# Patient Record
Sex: MALE | Race: BLACK OR AFRICAN AMERICAN | NOT HISPANIC OR LATINO | Employment: UNEMPLOYED | URBAN - METROPOLITAN AREA
[De-identification: names, ages, dates, MRNs, and addresses within clinical notes are randomized per-mention and may not be internally consistent; named-entity substitution may affect disease eponyms.]

---

## 2022-09-29 PROBLEM — C18.9 COLON ADENOCARCINOMA: Status: ACTIVE | Noted: 2022-09-29

## 2022-10-06 PROBLEM — Z51.11 ENCOUNTER FOR ANTINEOPLASTIC CHEMOTHERAPY: Status: ACTIVE | Noted: 2022-10-06

## 2022-10-12 ENCOUNTER — SPECIALTY PHARMACY (OUTPATIENT)
Dept: PHARMACY | Facility: CLINIC | Age: 57
End: 2022-10-12

## 2022-10-19 NOTE — TELEPHONE ENCOUNTER
Received rx for Xeloda. PA has been in process. > 24hr since initiation, will reach out to PA department for answers.    [place], this is Sharron Thakkar with [facility].  We are working on your prescription that your doctor has sent us. We will be working with your insurance to get this approved for you. We will be calling you along the way with updates on your medication.  If you have any questions, you can reach us at [phone].    Welcome call outcome: No answer/Unable to leave voicemail    Called to update pt. No VM box.

## 2022-10-24 ENCOUNTER — PATIENT MESSAGE (OUTPATIENT)
Dept: PHARMACY | Facility: CLINIC | Age: 57
End: 2022-10-24

## 2022-10-24 NOTE — TELEPHONE ENCOUNTER
PA denied stating Xeloda to be covered under Part B. Medicare card not on file.No voicemail box set up on either phone number. Message sent to portal.

## 2022-10-31 NOTE — TELEPHONE ENCOUNTER
Was able to contact pt and spouse. Pt unsure if taking pantoprazole, will notify MD. See I-vent. Received HH size and monthly income for FA to investigate copay assistance.

## 2022-10-31 NOTE — TELEPHONE ENCOUNTER
Xeloda covered with est copay of $22.83 for 21 day supply. Will again attempt to reach out to pt. Will inquire about pantoprazole use.

## 2022-10-31 NOTE — TELEPHONE ENCOUNTER
Sent assistance application to [facility] to request funding for Xeloda copay. Determination pending. Will continue to follow up.

## 2022-11-02 ENCOUNTER — SPECIALTY PHARMACY (OUTPATIENT)
Dept: PHARMACY | Facility: CLINIC | Age: 57
End: 2022-11-02

## 2022-11-02 ENCOUNTER — PATIENT MESSAGE (OUTPATIENT)
Dept: PHARMACY | Facility: CLINIC | Age: 57
End: 2022-11-02

## 2022-11-02 NOTE — TELEPHONE ENCOUNTER
Specialty Pharmacy - Initial Clinical Assessment    Specialty Medication Orders Linked to Encounter      Flowsheet Row Most Recent Value   Medication #1 capecitabine (XELODA) 500 MG Tab (Order#[id number], Rx#[id number])          Patient Diagnosis   C18.9 - Colon adenocarcinoma    Subjective    Malik Figueroa is a 57 y.o. male, who is followed by the specialty pharmacy service for management and education.    Recent Encounters       Date Type Provider Description    11/02/2022 Specialty Pharmacy Sharron Thakkar PharmD Initial Clinical Assessment    10/12/2022 Specialty Pharmacy Sharron Thakkar PharmD Referral Authorization          Clinical call attempts since last clinical assessment   11/2/2022  7:34 PM - Specialty Pharmacy - Clinical Assessment by Sharron Thakkar PharmD     Current Outpatient Medications   Medication Sig Note    amitriptyline (ELAVIL) 25 MG tablet Take 25 mg by mouth.     capecitabine (XELODA) 500 MG Tab Take 4 tablets (2,000 mg total) by mouth 2 (two) times daily.     capecitabine (XELODA) 500 MG Tab Take 4 tablets (2000 mg) twice daily for days 1-14 of every 21 day cycle.     cefpodoxime (VANTIN) 200 MG tablet  11/2/2022: Not sure if taking    dicyclomine (BENTYL) 20 mg tablet Take 20 mg by mouth every 6 (six) hours as needed.     divalproex (DEPAKOTE) 500 MG TbEC Take 500 mg by mouth every evening.     LINZESS 145 mcg Cap capsule SPRINKLE ENTIRE CONTENTS ON SMALL AMOUNT OF APPLESAUCE TAKE IMMEDIATELY BY MOUTH ON AN EMPTY STOMACH AT LEAST 30 MINUTES PRIOR 1ST MEAL OF THE DAY     naloxone (NARCAN) 4 mg/actuation Spry CALL 911. SPR CONTENTS OF ONE SPRAYER (0.1ML) INTO ONE NOSTRIL. REPEAT IN 2-3 MIN IF SYMPTOMS OF OPIOID EMERGENCY PERSIST, ALTERNATE NOSTRILS     ondansetron (ZOFRAN-ODT) 4 MG TbDL Dissolve 1 tablet by mouth 2 (two) times daily.     oxyCODONE (ROXICODONE) 5 MG immediate release tablet Take 1 tablet (5 mg total) by mouth every 12 (twelve) hours as needed for Pain.      pantoprazole (PROTONIX) 40 MG tablet Take 40 mg by mouth every morning. 11/2/2022: Unsure if taking    rizatriptan (MAXALT-MLT) 10 MG disintegrating tablet DISSOLVE ONE TABLET UNDER THE TONGUE AT ONSET OF HEADACHE     traMADoL (ULTRAM) 50 mg tablet Take 50 mg by mouth 2 (two) times daily as needed. 11/2/2022: Not taking since started oxycodone   Last reviewed on 11/2/2022  3:11 PM by Sharron Thakkar, PharmD    Review of patient's allergies indicates:   Allergen Reactions    Aspirin     Ibuprofen     Penicillins    Last reviewed on  11/2/2022 3:11 PM by Sharron Thakkar    Drug Interactions    Drug interactions evaluated: yes  Clinically relevant drug interactions identified: no  Provided the patient with educational material regarding drug interactions: not applicable         Adverse Effects    Fatigue: Pos  Constipation: Pos  *All other systems reviewed and are negative       Assessment Questions - Documented Responses      Flowsheet Row Most Recent Value   Assessment    Medication Reconciliation completed for patient Yes   During the past 4 weeks, has patient missed any activities due to condition or medication? No   During the past 4 weeks, did patient have any of the following urgent care visits? None   Goals of Therapy Status Discussed (new start)   Status of the patients ability to self-administer: Is Able   All education points have been covered with patient? Yes, supplemental printed education provided   Welcome packet contents reviewed and discussed with patient? Yes   Assesment completed? Yes   Plan Therapy being initiated   Do you need to open a clinical intervention (i-vent)? No   Do you want to schedule first shipment? Yes   Medication #1 Assessment Info    Patient status New medication, New to [facility]   Is this medication appropriate for the patient? Yes   Is this medication effective? Not yet started          Refill Questions - Documented Responses      Flowsheet Row Most Recent Value   Patient  "Availability and HIPAA Verification    Does patient want to proceed with activity? Yes   HIPAA/medical authority confirmed? Yes   Relationship to patient of person spoken to? Self   Refill Screening Questions    When does the patient need to receive the medication? 11/05/22   Refill Delivery Questions    How will the patient receive the medication? Mail   When does the patient need to receive the medication? 11/05/22   Shipping Address Home   Address in [facility] confirmed and updated if neccessary? Yes   Expected Copay ($) 22.83   Is the patient able to afford the medication copay? Yes   Payment Method invoice (approval required)  [CAGNO approved]   Days supply of Refill 21   Supplies needed? No supplies needed   Refill activity completed? Yes   Refill activity plan Refill scheduled   Shipment/Pickup Date: 11/03/22            Objective    He has a past medical history of Back pain, Colon cancer, and TBI (traumatic brain injury).    Tried/failed medications: None    BP Readings from Last 4 Encounters:   10/25/22 136/84   10/11/22 138/83   10/04/22 134/88   10/04/22 (!) 142/72     Ht Readings from Last 4 Encounters:   10/25/22 5' 9" (1.753 m)   10/11/22 5' 9" (1.753 m)   10/04/22 5' 9" (1.753 m)   09/27/22 5' 9" (1.753 m)     Wt Readings from Last 4 Encounters:   10/25/22 72.8 kg (160 lb 9.6 oz)   10/11/22 74.1 kg (163 lb 6.4 oz)   10/04/22 73.6 kg (162 lb 3.2 oz)   09/27/22 75.3 kg (166 lb 1.6 oz)     Recent Labs   Lab Result Units 10/25/22  0846 10/11/22  0935 10/04/22  0940 09/27/22  0924   RBC M/uL 4.25 L 4.25 L 3.92 L 3.81 L   Hemoglobin g/dL 8.7 L 8.7 L 7.4 L 7.5 L   Hematocrit % 30.4 L 29.6 L 26.4 L 25.7 L   WBC K/uL 7.51 7.80 6.86 6.71   Gran # (ANC) K/uL 5.2 5.6 4.6 4.8   Gran % % 69.0 71.5 67.0 71.6   Platelets K/uL 216 379 511 H 435   Sodium mmol/L 137 136 135 L 135 L   Potassium mmol/L 3.2 L 3.7 3.2 L 3.3 L   Chloride mmol/L 104 101 101 103   Glucose mg/dL 100 100 93 101   BUN mg/dL 11 12 11 7 "   Creatinine mg/dL 0.9 0.8 1.0 1.0   Calcium mg/dL 8.6 L 8.6 L 8.7 8.5 L   Total Protein g/dL 7.4 7.4 7.9 7.6   Albumin g/dL 2.8 L 2.5 L 2.9 L 2.5 L   Total Bilirubin mg/dL 0.7 0.8 0.8 0.6   Alkaline Phosphatase U/L 179 H 249 H 252 H 248 H   AST U/L 38 47 H 36 35   ALT U/L 29 30 27 29     The goals of cancer treatment include:  Achieving remission of cancer, if possible  Reducing tumor size and spread of cancer, if remission is not possible  Minimizing pain and symptoms of the cancer  Preventing infection and other complications of treatment  Promoting adequate nutrition  Encouraging proper hydration  Improving or maintaining quality of life  Maintaining optimal therapy adherence  Minimizing and managing side effects    Goals of Therapy Status: Discussed (new start)    Assessment/Plan  Patient plans to continue therapy without changes      Indication, dosage, appropriateness, effectiveness, safety and convenience of his specialty medication(s) were reviewed today.     Patient Education   Patient received education on the following:   Expectations and possible outcomes of therapy  Proper use, timely administration, and missed dose management  Duration of therapy  Side effects, including prevention, minimization, and management  Contraindications and safety precautions  New or changed medications, including prescribe and over the counter medications and supplements  Reviews recommended vaccinations, as appropriate  Storage, safe handling, and disposal    Pt started on therapy already, about 1 week into current cycle. Sending with calendar with days taken marked off. Counseled pt to bring current medication to doctors office at next visit so she may review. Pt not sure if taking pantoprazole. MD aware and will discuss at next visit.    Tasks added this encounter   11/16/2022 - Refill Call (Auto Added)  4/24/2023 - Clinical - Follow Up Assesement (180 day)   Tasks due within next 3 months   No tasks due.     Sharron  Bijan, PharmD  [facility]  [address]  [facility] [address]  Phone: [phone]  Fax: [phone]

## 2022-11-02 NOTE — TELEPHONE ENCOUNTER
Pt spouse not available to speak currently. Ask me to call Mr. Gan directly. No answer and no VM box to leave message.

## 2022-11-05 ENCOUNTER — TELEPHONE (OUTPATIENT)
Dept: PHARMACY | Facility: CLINIC | Age: 57
End: 2022-11-05

## 2022-11-05 NOTE — TELEPHONE ENCOUNTER
Patients wife left a message for the on call pharmacist. Xeloda was scheduled to be delivered by [employer], but the patient did not receive it. [employer] tracking indicates that there was a delivery exception. The package is currently at the [employer] shipping center at [address] [place] [address] in [place], [place]. According to the website, the [employer] shipping center is open until 2:00 on Saturday, and closed on Sunday. Called the number listed, and the automated system confirms that  is available until 2:00 pm. Called the patient's wife back and explained delivery exception and where to  the package. Patient's wife provided with the address and phone number to [employer]. [address] [place] [address] [phone]. Tracking number [id number] also provided. Routing assigned pharmacist to follow up on the patient receiving the medication.

## 2022-11-07 NOTE — TELEPHONE ENCOUNTER
Follow up call with the patient regarding the Xeloda prescription. Pt was able to  Xeloda from [employer] on Saturday 11/5/22. Pt did not miss any doses. Pt had no further questions or concerns.

## 2022-11-07 NOTE — TELEPHONE ENCOUNTER
Spoke with pts wife and updated currently address. Informed that we will check again before next shipment. Voiced understanding.

## 2022-11-16 ENCOUNTER — PATIENT MESSAGE (OUTPATIENT)
Dept: PHARMACY | Facility: CLINIC | Age: 57
End: 2022-11-16

## 2022-11-16 ENCOUNTER — SPECIALTY PHARMACY (OUTPATIENT)
Dept: PHARMACY | Facility: CLINIC | Age: 57
End: 2022-11-16

## 2022-11-16 NOTE — TELEPHONE ENCOUNTER
Outgoing call to pt about refill of Xeloda. Pt stated yesterday was day 1 of cycle, but refills are off. Pt has 6 days of therapy on hand and will need refill by 11/23. Claim RTS until 11/18. Patient was agreeable to call back on 11/18 for refill.

## 2022-11-18 ENCOUNTER — PATIENT MESSAGE (OUTPATIENT)
Dept: PHARMACY | Facility: CLINIC | Age: 57
End: 2022-11-18

## 2022-11-21 ENCOUNTER — SPECIALTY PHARMACY (OUTPATIENT)
Dept: PHARMACY | Facility: CLINIC | Age: 57
End: 2022-11-21

## 2022-11-21 NOTE — TELEPHONE ENCOUNTER
Specialty Pharmacy - Refill Coordination    Specialty Medication Orders Linked to Encounter      Flowsheet Row Most Recent Value   Medication #1 capecitabine (XELODA) 500 MG Tab (Order#[id number], Rx#[id number])          Refill Questions - Documented Responses      Flowsheet Row Most Recent Value   Patient Availability and HIPAA Verification    Does patient want to proceed with activity? Yes   HIPAA/medical authority confirmed? Yes   Relationship to patient of person spoken to? Spouse/Significant Other   Refill Screening Questions    Changes to allergies? No   Changes to medications? No   New conditions since last clinic visit? No   Unplanned office visit, urgent care, ED, or hospital admission in the last 4 weeks? No   How does patient/caregiver feel medication is working? Good   Financial problems or insurance changes? No   How many doses of your specialty medications were missed in the last 4 weeks? 0   Would patient like to speak to a pharmacist? No   When does the patient need to receive the medication? 11/27/22   Refill Delivery Questions    How will the patient receive the medication? Mail   When does the patient need to receive the medication? 11/27/22   Shipping Address Home   Address in [facility] confirmed and updated if neccessary? Yes   Expected Copay ($) 22.83   Is the patient able to afford the medication copay? No   Payment Method invoice (approval required)  [pending for KP]   Days supply of Refill 21   Supplies needed? No supplies needed   Refill activity completed? Yes   Refill activity plan Refill scheduled   Shipment/Pickup Date: 11/22/22            Current Outpatient Medications   Medication Sig    amitriptyline (ELAVIL) 25 MG tablet Take 25 mg by mouth.    capecitabine (XELODA) 500 MG Tab Take 4 tablets (2,000 mg total) by mouth 2 (two) times daily.    capecitabine (XELODA) 500 MG Tab Take 4 tablets (2000 mg) twice daily for days 1-14 of every 21 day cycle.    cefpodoxime (VANTIN)  200 MG tablet     dicyclomine (BENTYL) 20 mg tablet Take 20 mg by mouth every 6 (six) hours as needed.    divalproex (DEPAKOTE) 500 MG TbEC Take 500 mg by mouth every evening.    LINZESS 145 mcg Cap capsule SPRINKLE ENTIRE CONTENTS ON SMALL AMOUNT OF APPLESAUCE TAKE IMMEDIATELY BY MOUTH ON AN EMPTY STOMACH AT LEAST 30 MINUTES PRIOR 1ST MEAL OF THE DAY    naloxone (NARCAN) 4 mg/actuation Spry CALL 911. SPR CONTENTS OF ONE SPRAYER (0.1ML) INTO ONE NOSTRIL. REPEAT IN 2-3 MIN IF SYMPTOMS OF OPIOID EMERGENCY PERSIST, ALTERNATE NOSTRILS    ondansetron (ZOFRAN-ODT) 4 MG TbDL Dissolve 1 tablet by mouth 2 (two) times daily.    oxyCODONE (ROXICODONE) 5 MG immediate release tablet Take 1 tablet (5 mg total) by mouth every 12 (twelve) hours as needed for Pain.    pantoprazole (PROTONIX) 40 MG tablet Take 40 mg by mouth every morning.    rizatriptan (MAXALT-MLT) 10 MG disintegrating tablet DISSOLVE ONE TABLET UNDER THE TONGUE AT ONSET OF HEADACHE   Last reviewed on 11/15/2022  8:41 AM by Cuca Mendiola MA    Review of patient's allergies indicates:   Allergen Reactions    Aspirin     Ibuprofen     Penicillins     Last reviewed on  11/15/2022 8:40 AM by Cuca Mendiola      Tasks added this encounter   12/9/2022 - Refill Call (Auto Added)   Tasks due within next 3 months   No tasks due.     Ron Rodriguez, PharmD  [facility]  [address] [place] [address]  Phone: [phone]  Fax: [phone]

## 2022-11-21 NOTE — TELEPHONE ENCOUNTER
KP approved cost transfer in the amount of $22.83 for patients refill. Cost transfer form received.

## 2022-11-21 NOTE — TELEPHONE ENCOUNTER
Submitted assistance request to [facility] to request funds for Xeloda copay. Determination pending.

## 2022-11-21 NOTE — TELEPHONE ENCOUNTER
Incoming call from pt's wife stating that pt's off week is not until 11/29- 12/6, therefore pt needs medication now. Informed pt's wife that the earliest we can get medication to them was 11/23. Approved by radha Rosenthal, told pt's wife that it was okay to miss doses Monday 11/21 and Tuesday 11/22 and to resume therapy on Wednesday 11/23 when received. If medication is received in evening, take evening dose and resume normal schedule the next day.

## 2022-12-09 ENCOUNTER — SPECIALTY PHARMACY (OUTPATIENT)
Dept: PHARMACY | Facility: CLINIC | Age: 57
End: 2022-12-09

## 2022-12-09 NOTE — TELEPHONE ENCOUNTER
Incoming call from pts wife about refill of Xeloda. Stated they have enough tabs on hand to last until 12/19. Off week starts 12/20. Agreeable to call back on 12/20 for refill.

## 2022-12-20 ENCOUNTER — PATIENT MESSAGE (OUTPATIENT)
Dept: PHARMACY | Facility: CLINIC | Age: 57
End: 2022-12-20

## 2022-12-23 ENCOUNTER — SPECIALTY PHARMACY (OUTPATIENT)
Dept: PHARMACY | Facility: CLINIC | Age: 57
End: 2022-12-23

## 2022-12-23 NOTE — TELEPHONE ENCOUNTER
Specialty Pharmacy - Refill Coordination    Specialty Medication Orders Linked to Encounter      Flowsheet Row Most Recent Value   Medication #1 capecitabine (XELODA) 500 MG Tab (Order#[id number], Rx#[id number])            Refill Questions - Documented Responses      Flowsheet Row Most Recent Value   Patient Availability and HIPAA Verification    Does patient want to proceed with activity? Yes   HIPAA/medical authority confirmed? Yes   Relationship to patient of person spoken to? Spouse/Significant Other   Refill Screening Questions    Changes to allergies? No   Changes to medications? No   New conditions since last clinic visit? No   Unplanned office visit, urgent care, ED, or hospital admission in the last 4 weeks? No   How does patient/caregiver feel medication is working? Good   Financial problems or insurance changes? No   How many doses of your specialty medications were missed in the last 4 weeks? 0   Would patient like to speak to a pharmacist? No   When does the patient need to receive the medication? 12/28/22   Refill Delivery Questions    How will the patient receive the medication? Mail   When does the patient need to receive the medication? 12/28/22   Shipping Address Home   Address in [facility] confirmed and updated if neccessary? Yes   Expected Copay ($) 22.83   Is the patient able to afford the medication copay? Yes   Payment Method invoice (approval required Requested CAGNO)   Days supply of Refill 21   Supplies needed? No supplies needed   Refill activity completed? Yes   Refill activity plan Refill scheduled   Shipment/Pickup Date: 12/27/22            Current Outpatient Medications   Medication Sig    amitriptyline (ELAVIL) 25 MG tablet Take 25 mg by mouth.    capecitabine (XELODA) 500 MG Tab Take 4 tablets (2,000 mg total) by mouth 2 (two) times daily.    capecitabine (XELODA) 500 MG Tab Take 4 tablets (2000 mg) twice daily for days 1-14 of every 21 day cycle.    cefpodoxime (VANTIN) 200  MG tablet     dicyclomine (BENTYL) 20 mg tablet Take 20 mg by mouth every 6 (six) hours as needed.    divalproex (DEPAKOTE) 500 MG TbEC Take 500 mg by mouth every evening.    ferrous sulfate 324 mg (65 mg iron) TbEC Take 1 tablet (324 mg total) by mouth every Mon, Wed, Fri.    LINZESS 145 mcg Cap capsule SPRINKLE ENTIRE CONTENTS ON SMALL AMOUNT OF APPLESAUCE TAKE IMMEDIATELY BY MOUTH ON AN EMPTY STOMACH AT LEAST 30 MINUTES PRIOR 1ST MEAL OF THE DAY    naloxone (NARCAN) 4 mg/actuation Spry CALL 911. SPR CONTENTS OF ONE SPRAYER (0.1ML) INTO ONE NOSTRIL. REPEAT IN 2-3 MIN IF SYMPTOMS OF OPIOID EMERGENCY PERSIST, ALTERNATE NOSTRILS    ondansetron (ZOFRAN-ODT) 4 MG TbDL Dissolve 1 tablet by mouth 2 (two) times daily.    oxyCODONE (ROXICODONE) 5 MG immediate release tablet Take 1 tablet by mouth every 12 hours as needed for Pain.    pantoprazole (PROTONIX) 40 MG tablet Take 40 mg by mouth every morning.    rizatriptan (MAXALT-MLT) 10 MG disintegrating tablet DISSOLVE ONE TABLET UNDER THE TONGUE AT ONSET OF HEADACHE   Last reviewed on 12/6/2022  8:38 AM by Cuca Mendiola MA    Review of patient's allergies indicates:   Allergen Reactions    Aspirin     Ibuprofen     Penicillins     Last reviewed on  12/21/2022 11:13 AM by Johanny Cannon      Tasks added this encounter   1/10/2023 - Refill Call (Auto Added)   Tasks due within next 3 months   No tasks due.     Clare Prado, PharmD  [facility]  [address] [place] [address]  Phone: [phone]  Fax: [phone]

## 2022-12-23 NOTE — TELEPHONE ENCOUNTER
Submitted assistance request to [facility] to request funds for patients refill scheduled to ship on 12/27. Determination pending.

## 2022-12-27 NOTE — TELEPHONE ENCOUNTER
KP approved cost transfer in the amount of $22.83 for patients refill scheduled to ship on 12/27. Cost transfer form received.

## 2023-01-05 ENCOUNTER — SPECIALTY PHARMACY (OUTPATIENT)
Dept: PHARMACY | Facility: CLINIC | Age: 58
End: 2023-01-05

## 2023-01-05 NOTE — TELEPHONE ENCOUNTER
Secured PAN srinivasa for Xeloda and patient will pay $0 with new srinivasa on file. Srinivasa has been added to [facility] for next refill.    FOR DOCUMENTATION ONLY:  Financial Assistance for Xeloda approved from 1/5/23 to 1/4/24  Source: [facility]  BIN: [id number]  DWAYNEN: REBECCA  Id: [id number]  GRP: [id number]  Srinivasa Amount: $2400

## 2023-01-10 ENCOUNTER — SPECIALTY PHARMACY (OUTPATIENT)
Dept: PHARMACY | Facility: CLINIC | Age: 58
End: 2023-01-10

## 2023-01-10 NOTE — TELEPHONE ENCOUNTER
Specialty Pharmacy - Refill Coordination    Specialty Medication Orders Linked to Encounter      Flowsheet Row Most Recent Value   Medication #1 capecitabine (XELODA) 500 MG Tab (Order#[id number], Rx#[id number])            Refill Questions - Documented Responses      Flowsheet Row Most Recent Value   Patient Availability and HIPAA Verification    Does patient want to proceed with activity? Yes   HIPAA/medical authority confirmed? Yes   Relationship to patient of person spoken to? Self   Refill Screening Questions    Changes to allergies? No   Changes to medications? No   New conditions since last clinic visit? No   Unplanned office visit, urgent care, ED, or hospital admission in the last 4 weeks? No   How does patient/caregiver feel medication is working? Good   Financial problems or insurance changes? No   How many doses of your specialty medications were missed in the last 4 weeks? 0   Would patient like to speak to a pharmacist? No   When does the patient need to receive the medication? 01/18/23   Refill Delivery Questions    How will the patient receive the medication? Mail   When does the patient need to receive the medication? 01/18/23   Shipping Address Home   Address in [facility] confirmed and updated if neccessary? Yes   Expected Copay ($) 0   Is the patient able to afford the medication copay? Yes   Payment Method zero copay   Days supply of Refill 21   Supplies needed? No supplies needed   Refill activity completed? Yes   Refill activity plan Refill scheduled   Shipment/Pickup Date: 01/12/23            Current Outpatient Medications   Medication Sig    amitriptyline (ELAVIL) 25 MG tablet Take 25 mg by mouth.    capecitabine (XELODA) 500 MG Tab Take 4 tablets (2000 mg) twice daily for days 1-14 of every 21 day cycle.    cefpodoxime (VANTIN) 200 MG tablet     dicyclomine (BENTYL) 20 mg tablet Take 20 mg by mouth every 6 (six) hours as needed.    divalproex (DEPAKOTE) 500 MG TbEC Take 500 mg by  mouth every evening.    ferrous sulfate 324 mg (65 mg iron) TbEC Take 1 tablet (324 mg total) by mouth every Mon, Wed, Fri.    LINZESS 145 mcg Cap capsule SPRINKLE ENTIRE CONTENTS ON SMALL AMOUNT OF APPLESAUCE TAKE IMMEDIATELY BY MOUTH ON AN EMPTY STOMACH AT LEAST 30 MINUTES PRIOR 1ST MEAL OF THE DAY    naloxone (NARCAN) 4 mg/actuation Spry CALL 911. SPR CONTENTS OF ONE SPRAYER (0.1ML) INTO ONE NOSTRIL. REPEAT IN 2-3 MIN IF SYMPTOMS OF OPIOID EMERGENCY PERSIST, ALTERNATE NOSTRILS    ondansetron (ZOFRAN-ODT) 4 MG TbDL Dissolve 1 tablet by mouth 2 (two) times daily.    oxyCODONE (ROXICODONE) 5 MG immediate release tablet Take 1 tablet by mouth every 12 hours as needed for Pain.    pantoprazole (PROTONIX) 40 MG tablet Take 40 mg by mouth every morning.    rizatriptan (MAXALT-MLT) 10 MG disintegrating tablet DISSOLVE ONE TABLET UNDER THE TONGUE AT ONSET OF HEADACHE   Last reviewed on 12/27/2022  8:39 AM by Cuca Mendiola MA    Review of patient's allergies indicates:   Allergen Reactions    Aspirin     Ibuprofen     Penicillins     Last reviewed on  12/27/2022 8:39 AM by Cuca Mendiola      Tasks added this encounter   2/1/2023 - Refill Call (Auto Added)   Tasks due within next 3 months   No tasks due.     Ariadna Baeza, PharmD  [facility]  [address] [place] [address]  Phone: [phone]  Fax: [phone]

## 2023-02-01 ENCOUNTER — SPECIALTY PHARMACY (OUTPATIENT)
Dept: PHARMACY | Facility: CLINIC | Age: 58
End: 2023-02-01

## 2023-02-01 NOTE — TELEPHONE ENCOUNTER
Specialty Pharmacy - Refill Coordination    Specialty Medication Orders Linked to Encounter      Flowsheet Row Most Recent Value   Medication #1 capecitabine (XELODA) 500 MG Tab (Order#[id number], Rx#[id number])            Refill Questions - Documented Responses      Flowsheet Row Most Recent Value   Patient Availability and HIPAA Verification    Does patient want to proceed with activity? Yes   HIPAA/medical authority confirmed? Yes   Relationship to patient of person spoken to? Spouse/Significant Other   Refill Screening Questions    Changes to allergies? No   Changes to medications? No   New conditions since last clinic visit? No   Unplanned office visit, urgent care, ED, or hospital admission in the last 4 weeks? No   How does patient/caregiver feel medication is working? Good   Financial problems or insurance changes? No   How many doses of your specialty medications were missed in the last 4 weeks? 0   Would patient like to speak to a pharmacist? No   When does the patient need to receive the medication? 02/08/23   Refill Delivery Questions    How will the patient receive the medication? Mail   When does the patient need to receive the medication? 02/08/23   Shipping Address Home   Address in [facility] confirmed and updated if neccessary? Yes   Expected Copay ($) 0   Is the patient able to afford the medication copay? Yes   Payment Method zero copay   Days supply of Refill 21   Supplies needed? No supplies needed   Refill activity completed? Yes   Refill activity plan Refill scheduled   Shipment/Pickup Date: 02/06/23            Current Outpatient Medications   Medication Sig    amitriptyline (ELAVIL) 25 MG tablet Take 25 mg by mouth.    capecitabine (XELODA) 500 MG Tab Take 4 tablets (2000 mg) twice daily for days 1-14 of every 21 day cycle.    cefpodoxime (VANTIN) 200 MG tablet     dexAMETHasone (DECADRON) 4 MG Tab Take 8 mg ( 2 pills ) once daily from day 2 to day 4 after chemotherapy    dicyclomine  (BENTYL) 20 mg tablet Take 20 mg by mouth every 6 (six) hours as needed.    divalproex (DEPAKOTE) 500 MG TbEC Take 500 mg by mouth every evening.    ferrous sulfate 324 mg (65 mg iron) TbEC Take 1 tablet (324 mg total) by mouth every Mon, Wed, Fri.    LINZESS 145 mcg Cap capsule SPRINKLE ENTIRE CONTENTS ON SMALL AMOUNT OF APPLESAUCE TAKE IMMEDIATELY BY MOUTH ON AN EMPTY STOMACH AT LEAST 30 MINUTES PRIOR 1ST MEAL OF THE DAY    naloxone (NARCAN) 4 mg/actuation Spry CALL 911. SPR CONTENTS OF ONE SPRAYER (0.1ML) INTO ONE NOSTRIL. REPEAT IN 2-3 MIN IF SYMPTOMS OF OPIOID EMERGENCY PERSIST, ALTERNATE NOSTRILS    ondansetron (ZOFRAN-ODT) 8 MG TbDL Take 1 tablet  by mouth every 6 (six) hours as needed (nausea).    oxyCODONE (ROXICODONE) 5 MG immediate release tablet Take 1 tablet by mouth every 12 hours as needed for Pain.    pantoprazole (PROTONIX) 40 MG tablet Take 40 mg by mouth every morning.    promethazine (PHENERGAN) 25 MG tablet Take 1 tablet  by mouth every 6 (six) hours as needed for Nausea.    rizatriptan (MAXALT-MLT) 10 MG disintegrating tablet DISSOLVE ONE TABLET UNDER THE TONGUE AT ONSET OF HEADACHE   Last reviewed on 1/17/2023  9:20 AM by Ariadna Parsons MA    Review of patient's allergies indicates:   Allergen Reactions    Aspirin     Ibuprofen     Penicillins     Last reviewed on  1/17/2023 9:20 AM by Ariadna Parsons      Tasks added this encounter   2/22/2023 - Refill Call (Auto Added)   Tasks due within next 3 months   4/24/2023 - Clinical - Follow Up Assesement (180 day)     Ariadna Baeza, PharmD  [facility]  [address] [place] [address]  Phone: [phone]  Fax: [phone]

## 2023-02-22 ENCOUNTER — SPECIALTY PHARMACY (OUTPATIENT)
Dept: PHARMACY | Facility: CLINIC | Age: 58
End: 2023-02-22

## 2023-02-22 NOTE — TELEPHONE ENCOUNTER
Specialty Pharmacy - Refill Coordination    Specialty Medication Orders Linked to Encounter      Flowsheet Row Most Recent Value   Medication #1 capecitabine (XELODA) 500 MG Tab (Order#[id number], Rx#[id number])            Refill Questions - Documented Responses      Flowsheet Row Most Recent Value   Patient Availability and HIPAA Verification    Does patient want to proceed with activity? Yes   HIPAA/medical authority confirmed? Yes   Relationship to patient of person spoken to? Spouse/Significant Other   Refill Screening Questions    Changes to allergies? No   Changes to medications? No   New conditions since last clinic visit? No   Unplanned office visit, urgent care, ED, or hospital admission in the last 4 weeks? No   How does patient/caregiver feel medication is working? Good   Financial problems or insurance changes? No   How many doses of your specialty medications were missed in the last 4 weeks? 0   Would patient like to speak to a pharmacist? No   When does the patient need to receive the medication? 03/01/23   Refill Delivery Questions    How will the patient receive the medication? Mail   When does the patient need to receive the medication? 03/01/23   Shipping Address Home   Address in [facility] confirmed and updated if neccessary? Yes   Expected Copay ($) 0   Is the patient able to afford the medication copay? Yes   Payment Method zero copay   Days supply of Refill 21   Supplies needed? No supplies needed   Refill activity completed? Yes   Refill activity plan Refill scheduled   Shipment/Pickup Date: 02/27/23            Current Outpatient Medications   Medication Sig    amitriptyline (ELAVIL) 25 MG tablet Take 25 mg by mouth.    capecitabine (XELODA) 500 MG Tab Take 4 tablets (2000 mg) twice daily for days 1-14 of every 21 day cycle.    cefpodoxime (VANTIN) 200 MG tablet     dexAMETHasone (DECADRON) 4 MG Tab Take 8 mg ( 2 pills ) once daily from day 2 to day 4 after chemotherapy    dicyclomine  (BENTYL) 20 mg tablet Take 20 mg by mouth every 6 (six) hours as needed.    divalproex (DEPAKOTE) 500 MG TbEC Take 500 mg by mouth every evening.    ferrous sulfate 324 mg (65 mg iron) TbEC Take 1 tablet (324 mg total) by mouth every Mon, Wed, Fri.    LINZESS 145 mcg Cap capsule SPRINKLE ENTIRE CONTENTS ON SMALL AMOUNT OF APPLESAUCE TAKE IMMEDIATELY BY MOUTH ON AN EMPTY STOMACH AT LEAST 30 MINUTES PRIOR 1ST MEAL OF THE DAY    naloxone (NARCAN) 4 mg/actuation Spry CALL 911. SPR CONTENTS OF ONE SPRAYER (0.1ML) INTO ONE NOSTRIL. REPEAT IN 2-3 MIN IF SYMPTOMS OF OPIOID EMERGENCY PERSIST, ALTERNATE NOSTRILS    ondansetron (ZOFRAN-ODT) 8 MG TbDL Take 1 tablet  by mouth every 6 (six) hours as needed (nausea).    oxyCODONE (ROXICODONE) 5 MG immediate release tablet Take 1 tablet by mouth every 12 hours as needed for Pain.    pantoprazole (PROTONIX) 40 MG tablet Take 40 mg by mouth every morning.    promethazine (PHENERGAN) 25 MG tablet Take 1 tablet  by mouth every 6 (six) hours as needed for Nausea.    rizatriptan (MAXALT-MLT) 10 MG disintegrating tablet DISSOLVE ONE TABLET UNDER THE TONGUE AT ONSET OF HEADACHE   Last reviewed on 1/17/2023  9:20 AM by Ariadna Parsons MA    Review of patient's allergies indicates:   Allergen Reactions    Aspirin     Ibuprofen     Penicillins     Last reviewed on  2/7/2023 10:39 AM by Negar Jansen      Tasks added this encounter   3/15/2023 - Refill Call (Auto Added)   Tasks due within next 3 months   4/24/2023 - Clinical - Follow Up Assesement (180 day)     Ariadna Baeza, PharmD  [facility]  [address] [place] [address]  Phone: [phone]  Fax: [phone]

## 2023-03-15 ENCOUNTER — SPECIALTY PHARMACY (OUTPATIENT)
Dept: PHARMACY | Facility: CLINIC | Age: 58
End: 2023-03-15

## 2023-03-15 NOTE — TELEPHONE ENCOUNTER
Outgoing call to pt about refill of Xeloda. Per pts wife, next cycle starts 3/21. Informed her that RX was out of refills and request sent to [facility] and once new RX received will call back to set up delivery. Voiced understanding.

## 2023-03-20 NOTE — TELEPHONE ENCOUNTER
Specialty Pharmacy - Refill Coordination    Specialty Medication Orders Linked to Encounter      Flowsheet Row Most Recent Value   Medication #1 capecitabine (XELODA) 500 MG Tab (Order#[id number], Rx#[id number])          Refill Questions - Documented Responses      Flowsheet Row Most Recent Value   Patient Availability and HIPAA Verification    Does patient want to proceed with activity? Unable to Reach   HIPAA/medical authority confirmed? Yes   Relationship to patient of person spoken to? Spouse/Significant Other          We have had multiple attempts to the patient and have been unsuccessful to reach the patient. We will stop reaching out to the patient but in the event that the patient needs the med and contacts us, we will communicate and begin dispensing for the patient. At your next visit with the patient, please review the importance of being in contact with our specialty pharmacy as a part of our care team.    Interventions added this encounter   Closed: [facility] Patient Intervention: capecitabine (XELODA) 500 MG Tab     Sharron Thakkar, PharmD  [facility]  [address]  [facility] [address]  Phone: [phone]  Fax: [phone]

## 2023-04-18 PROBLEM — C78.7 ADENOCARCINOMA OF COLON METASTATIC TO LIVER: Status: ACTIVE | Noted: 2023-04-18

## 2023-04-18 PROBLEM — C18.9 ADENOCARCINOMA OF COLON METASTATIC TO LIVER: Status: ACTIVE | Noted: 2023-04-18

## 2023-04-25 ENCOUNTER — PATIENT MESSAGE (OUTPATIENT)
Dept: RESEARCH | Facility: HOSPITAL | Age: 58
End: 2023-04-25

## 2023-05-02 ENCOUNTER — PATIENT MESSAGE (OUTPATIENT)
Dept: RESEARCH | Facility: HOSPITAL | Age: 58
End: 2023-05-02

## 2023-05-10 ENCOUNTER — TELEPHONE (OUTPATIENT)
Dept: PHARMACY | Facility: CLINIC | Age: 58
End: 2023-05-10

## 2023-05-10 ENCOUNTER — PATIENT MESSAGE (OUTPATIENT)
Dept: PHARMACY | Facility: CLINIC | Age: 58
End: 2023-05-10

## 2023-05-10 ENCOUNTER — SPECIALTY PHARMACY (OUTPATIENT)
Dept: PHARMACY | Facility: CLINIC | Age: 58
End: 2023-05-10

## 2023-05-10 DIAGNOSIS — C18.9 ADENOCARCINOMA OF COLON METASTATIC TO LIVER: Primary | ICD-10-CM

## 2023-05-10 DIAGNOSIS — C78.7 ADENOCARCINOMA OF COLON METASTATIC TO LIVER: Primary | ICD-10-CM

## 2023-05-10 NOTE — TELEPHONE ENCOUNTER
Keny, this is Ariadna Baeza, clinical pharmacist with [facility] that is part of your care team.  We have begun working on your prescription that your doctor has sent us. Our next steps include:     Working with your insurance company to obtain approval for your medication  Working with you to ensure your medication is affordable     We will be calling you along the way with updates on your medication but if you have any concerns or receive information that you would like to discuss please reach us at [phone].    Welcome call outcome: No answer/Unable to leave voicemail

## 2023-05-10 NOTE — TELEPHONE ENCOUNTER
Tried all 3 phone numbers; could not LVM with any of them. I will send the patient a message through his patient portal and message the physician. Will also route to assigned pharmacist for follow up

## 2023-05-11 NOTE — TELEPHONE ENCOUNTER
Specialty Pharmacy - Initial Clinical Assessment    Specialty Medication Orders Linked to Encounter      Flowsheet Row Most Recent Value   Medication #1 capecitabine (XELODA) 500 MG Tab (Order#[id number], Rx#[id number])          Patient Diagnosis   C18.9 - Colon adenocarcinoma    Subjective    Malik Figueroa is a 58 y.o. male, who is followed by the specialty pharmacy service for management and education.    Recent Encounters       Date Type Provider Description    05/10/2023 Specialty Pharmacy Teodoro Rao PharmD Initial Clinical Assessment    05/10/2023 Specialty Pharmacy Ariadna Baeza PharmD Referral Authorization    03/15/2023 Specialty Pharmacy Ariadna Baeza PharmD Refill Coordination    02/22/2023 Specialty Pharmacy Ariadna Baeza PharmD Refill Coordination    02/01/2023 Specialty Pharmacy Ariadna Baeza PharmD Refill Coordination            Current Outpatient Medications   Medication Sig    amitriptyline (ELAVIL) 25 MG tablet Take 25 mg by mouth.    capecitabine (XELODA) 500 MG Tab Take 3 tablets (1,500 mg total) by mouth 2 (two) times daily for days 1-14 of every 21 day cycle.    dexAMETHasone (DECADRON) 4 MG Tab Take 8 mg ( 2 pills ) once daily from day 2 to day 4 after chemotherapy    dicyclomine (BENTYL) 20 mg tablet Take 20 mg by mouth every 6 (six) hours as needed.    divalproex (DEPAKOTE) 500 MG TbEC Take 500 mg by mouth every evening.    ferrous sulfate 324 mg (65 mg iron) TbEC Take 1 tablet (324 mg total) by mouth every Mon, Wed, Fri.    levoFLOXacin (LEVAQUIN) 750 MG tablet Take 1 tablet by mouth once daily.    LINZESS 145 mcg Cap capsule SPRINKLE ENTIRE CONTENTS ON SMALL AMOUNT OF APPLESAUCE TAKE IMMEDIATELY BY MOUTH ON AN EMPTY STOMACH AT LEAST 30 MINUTES PRIOR 1ST MEAL OF THE DAY    metoprolol tartrate (LOPRESSOR) 25 MG tablet Take 0.5 tablets (12.5 mg total) by mouth 2 (two) times daily.    naloxone (NARCAN) 4 mg/actuation Spry CALL 911. SPR CONTENTS OF ONE SPRAYER (0.1ML)  INTO ONE NOSTRIL. REPEAT IN 2-3 MIN IF SYMPTOMS OF OPIOID EMERGENCY PERSIST, ALTERNATE NOSTRILS    ondansetron (ZOFRAN-ODT) 8 MG TbDL Take 1 tablet  by mouth every 6 (six) hours as needed (nausea).    oxyCODONE (ROXICODONE) 10 mg Tab immediate release tablet Take 1 tablet (10 mg total) by mouth every 8 (eight) hours as needed for Pain.    promethazine (PHENERGAN) 25 MG tablet Take 1 tablet  by mouth every 6 (six) hours as needed for Nausea.    rizatriptan (MAXALT-MLT) 10 MG disintegrating tablet DISSOLVE ONE TABLET UNDER THE TONGUE AT ONSET OF HEADACHE    cefpodoxime (VANTIN) 200 MG tablet    Last reviewed on 5/11/2023  2:29 PM by Teodoro Rao, PharmD    Review of patient's allergies indicates:   Allergen Reactions    Aspirin     Ibuprofen     Penicillins    Last reviewed on  5/11/2023 2:28 PM by Teodoro Rao    Drug Interactions    Drug interactions evaluated: yes  Clinically relevant drug interactions identified: yes   Interactions list: Capecitabine/levofloxacin (C) - Qtc prolongation risk. Patient got a recent EKG in 2/2023; Qtc was 424ms, which is wn     Drug management plan: Capecitabine/levofloxacin (C) - Qtc prolongation risk. Patient got a recent EKG in 2/2023; Qtc was 424ms, which is wn   Provided the patient with educational material regarding drug interactions: not applicable           Assessment Questions - Documented Responses      Flowsheet Row Most Recent Value   Assessment    Medication Reconciliation completed for patient Yes   During the past 4 weeks, has patient missed any activities due to condition or medication? Missed Planned Activities   During the past 4 weeks, did patient have any of the following urgent care visits? None   Goals of Therapy Status Discussed (new start)   Status of the patients ability to self-administer: Is Able   All education points have been covered with patient? Yes, supplemental printed education provided   Welcome packet contents reviewed and discussed with  "patient? Yes   Assesment completed? Yes   Plan Therapy being initiated   Do you need to open a clinical intervention (i-vent)? No   Do you want to schedule first shipment? Yes   Medication #1 Assessment Info    Patient status Existing medication, Exisiting to [facility]   Is this medication appropriate for the patient? Yes   Is this medication effective? Not yet started          Refill Questions - Documented Responses      Flowsheet Row Most Recent Value   Patient Availability and HIPAA Verification    Does patient want to proceed with activity? Yes   HIPAA/medical authority confirmed? Yes   Relationship to patient of person spoken to? Self   Refill Screening Questions    When does the patient need to receive the medication? 05/16/23   Refill Delivery Questions    How will the patient receive the medication? Mail   When does the patient need to receive the medication? 05/16/23   Shipping Address Home   Address in [facility] confirmed and updated if neccessary? Yes   Expected Copay ($) 0   Is the patient able to afford the medication copay? Yes   Payment Method zero copay   Days supply of Refill 21   Supplies needed? No supplies needed   Refill activity completed? Yes   Refill activity plan Refill scheduled   Shipment/Pickup Date: 05/15/23            Objective    He has a past medical history of Back pain, Colon cancer, and TBI (traumatic brain injury).    Tried/failed medications: Xeloda/oxaliplatin; now starting on Xeloda/irinotecan    BP Readings from Last 4 Encounters:   05/09/23 134/77   05/02/23 (!) 142/84   04/21/23 130/73   04/18/23 139/88     Ht Readings from Last 4 Encounters:   05/09/23 5' 9" (1.753 m)   05/02/23 5' 9" (1.753 m)   04/21/23 5' 9" (1.753 m)   04/18/23 5' 9" (1.753 m)     Wt Readings from Last 4 Encounters:   05/09/23 80.1 kg (176 lb 8 oz)   05/02/23 80.2 kg (176 lb 14.4 oz)   04/21/23 80.3 kg (177 lb)   04/18/23 80.9 kg (178 lb 4.8 oz)     Recent Labs   Lab Result Units 05/09/23  0932 " 05/02/23  0813 04/18/23  0836 04/04/23  0943   RBC M/uL 3.30 L 3.95 L 3.86 L 3.76 L   Hemoglobin g/dL 8.7 L 10.4 L 10.1 L 10.2 L   Hematocrit % 28.4 L 34.4 L 33.9 L 33.2 L   WBC K/uL 1.53 LL 6.42 6.52 6.30   Gran # (ANC) K/uL  --  4.2 4.1 4.3   Gran % % 46.0 64.9 62.1 68.3   Platelets K/uL 109 L 128 L 140 L 135 L   Sodium mmol/L 137 139 137 136   Potassium mmol/L 3.6 3.6 3.7 3.8   Chloride mmol/L 106 104 106 105   Glucose mg/dL 134 H 87 150 H 140 H   BUN mg/dL 12 9 9 11   Creatinine mg/dL 1.0 0.9 1.0 1.0   Calcium mg/dL 8.3 L 9.1 9.0 9.2   Total Protein g/dL 6.9 7.7 8.0 8.2   Albumin g/dL 2.7 L 3.1 L 2.9 L 2.8 L   Total Bilirubin mg/dL 0.7 0.8 0.8 1.2 H   Alkaline Phosphatase U/L 112 123 125 127   AST U/L 37 39 47 H 50 H   ALT U/L 15 13 13 18     The goals of cancer treatment include:  Achieving remission of cancer, if possible  Reducing tumor size and spread of cancer, if remission is not possible  Minimizing pain and symptoms of the cancer  Preventing infection and other complications of treatment  Promoting adequate nutrition  Encouraging proper hydration  Improving or maintaining quality of life  Maintaining optimal therapy adherence  Minimizing and managing side effects    Goals of Therapy Status: Discussed (new start)    Assessment/Plan  Patient plans to start therapy on 05/16/23      Indication, dosage, appropriateness, effectiveness, safety and convenience of his specialty medication(s) were reviewed today.     Patient Education   Patient received education on the following:   Expectations and possible outcomes of therapy  Proper use, timely administration, and missed dose management  Duration of therapy  Side effects, including prevention, minimization, and management  Contraindications and safety precautions  New or changed medications, including prescribe and over the counter medications and supplements  Reviews recommended vaccinations, as appropriate  Storage, safe handling, and disposal    Patient  declined side effect counseling, though he did allow me to tell him about how to take his medication. He has been on Xeloda with us not too long ago.    Tasks added this encounter   No tasks added.   Tasks due within next 3 months   5/11/2023 - Initial Clinical Assessment/Patient Education (180 Day Reassessment)  5/10/2023 - Set up Initial Fill     Teodoro Rao, PharmD  [facility]  [address]  [facility] [address]  Phone: [phone]  Fax: [phone]

## 2023-05-29 ENCOUNTER — SPECIALTY PHARMACY (OUTPATIENT)
Dept: PHARMACY | Facility: CLINIC | Age: 58
End: 2023-05-29

## 2023-05-29 NOTE — TELEPHONE ENCOUNTER
Specialty Pharmacy - Refill Coordination    Specialty Medication Orders Linked to Encounter      Flowsheet Row Most Recent Value   Medication #1 capecitabine (XELODA) 500 MG Tab (Order#[id number], Rx#[id number])            Refill Questions - Documented Responses      Flowsheet Row Most Recent Value   Patient Availability and HIPAA Verification    Does patient want to proceed with activity? Yes   HIPAA/medical authority confirmed? Yes   Relationship to patient of person spoken to? Spouse/Significant Other   Refill Screening Questions    Changes to allergies? No   Changes to medications? No   New conditions since last clinic visit? No   Unplanned office visit, urgent care, ED, or hospital admission in the last 4 weeks? No   How does patient/caregiver feel medication is working? Good   Financial problems or insurance changes? No   How many doses of your specialty medications were missed in the last 4 weeks? 0   Would patient like to speak to a pharmacist? No   When does the patient need to receive the medication? 06/05/23   Refill Delivery Questions    How will the patient receive the medication? Mail   When does the patient need to receive the medication? 06/05/23   Shipping Address Home   Address in [facility] confirmed and updated if neccessary? Yes   Expected Copay ($) 0   Is the patient able to afford the medication copay? Yes   Payment Method zero copay   Days supply of Refill 21   Supplies needed? No supplies needed   Refill activity completed? Yes   Refill activity plan Refill scheduled   Shipment/Pickup Date: 06/01/23            Current Outpatient Medications   Medication Sig    amitriptyline (ELAVIL) 25 MG tablet Take 25 mg by mouth.    capecitabine (XELODA) 500 MG Tab Take 3 tablets (1,500 mg total) by mouth 2 (two) times daily for days 1-14 of every 21 day cycle.    cefpodoxime (VANTIN) 200 MG tablet     dexAMETHasone (DECADRON) 4 MG Tab Take 8 mg ( 2 pills ) once daily from day 2 to day 4 after  chemotherapy    dicyclomine (BENTYL) 20 mg tablet Take 20 mg by mouth every 6 (six) hours as needed.    divalproex (DEPAKOTE) 500 MG TbEC Take 500 mg by mouth every evening.    ferrous sulfate 324 mg (65 mg iron) TbEC Take 1 tablet (324 mg total) by mouth every Mon, Wed, Fri.    levoFLOXacin (LEVAQUIN) 750 MG tablet Take 1 tablet by mouth once daily.    LINZESS 145 mcg Cap capsule SPRINKLE ENTIRE CONTENTS ON SMALL AMOUNT OF APPLESAUCE TAKE IMMEDIATELY BY MOUTH ON AN EMPTY STOMACH AT LEAST 30 MINUTES PRIOR 1ST MEAL OF THE DAY    metoprolol succinate (TOPROL-XL) 100 MG 24 hr tablet Take 1 tablet (100 mg total) by mouth once daily.    naloxone (NARCAN) 4 mg/actuation Spry CALL 911. SPR CONTENTS OF ONE SPRAYER (0.1ML) INTO ONE NOSTRIL. REPEAT IN 2-3 MIN IF SYMPTOMS OF OPIOID EMERGENCY PERSIST, ALTERNATE NOSTRILS    ondansetron (ZOFRAN-ODT) 8 MG TbDL Take 1 tablet  by mouth every 6 (six) hours as needed (nausea).    oxyCODONE (ROXICODONE) 10 mg Tab immediate release tablet Take 1 tablet (10 mg total) by mouth every 8 (eight) hours as needed for Pain.    promethazine (PHENERGAN) 25 MG tablet Take 1 tablet  by mouth every 6 (six) hours as needed for Nausea. (Patient not taking: Reported on 5/25/2023)    rizatriptan (MAXALT-MLT) 10 MG disintegrating tablet DISSOLVE ONE TABLET UNDER THE TONGUE AT ONSET OF HEADACHE    traZODone (DESYREL) 50 MG tablet    Last reviewed on 5/23/2023  9:01 AM by Travis Sun MA    Review of patient's allergies indicates:   Allergen Reactions    Aspirin     Ibuprofen     Penicillins     Last reviewed on  5/25/2023 11:47 AM by Jean Claude Uwamungu      Tasks added this encounter   No tasks added.   Tasks due within next 3 months   No tasks due.     Ariadna Baeza, PharmD  [facility]  [address] [facility] [address]  Phone: [phone]  Fax: [phone]

## 2023-06-19 ENCOUNTER — SPECIALTY PHARMACY (OUTPATIENT)
Dept: PHARMACY | Facility: CLINIC | Age: 58
End: 2023-06-19

## 2023-06-19 NOTE — TELEPHONE ENCOUNTER
Outgoing call to pt about refill of Xeloda. Pt declined refill. Stated he had surgery and had not been taking it. Has apt 6/20 to discuss medications. Will follow up after apt.     Pt also stated he is having some numbness in his hands and feet. Counseled pt to try OTC cream with Urea. Voiced understanding.

## 2023-06-21 ENCOUNTER — PATIENT MESSAGE (OUTPATIENT)
Dept: PHARMACY | Facility: CLINIC | Age: 58
End: 2023-06-21

## 2023-06-26 ENCOUNTER — SPECIALTY PHARMACY (OUTPATIENT)
Dept: PHARMACY | Facility: CLINIC | Age: 58
End: 2023-06-26

## 2023-06-26 NOTE — TELEPHONE ENCOUNTER
Outgoing call to pt about refill of Xeloda. Per pts wife, she is not sure when cycle starts. Pt has been holding due to surgery. Message sent to [facility] to verify when cycle should start. Pts wife agreeable to refill call once [facility] responds.

## 2023-07-03 ENCOUNTER — SPECIALTY PHARMACY (OUTPATIENT)
Dept: PHARMACY | Facility: CLINIC | Age: 58
End: 2023-07-03

## 2023-07-03 NOTE — TELEPHONE ENCOUNTER
Specialty Pharmacy - Refill Coordination    Specialty Medication Orders Linked to Encounter      Flowsheet Row Most Recent Value   Medication #1 capecitabine (XELODA) 500 MG Tab (Order#[id number], Rx#[id number])            Refill Questions - Documented Responses      Flowsheet Row Most Recent Value   Patient Availability and HIPAA Verification    Does patient want to proceed with activity? Yes   HIPAA/medical authority confirmed? Yes   Relationship to patient of person spoken to? Spouse/Significant Other   Refill Screening Questions    Changes to allergies? No   Changes to medications? No   New conditions since last clinic visit? No   Unplanned office visit, urgent care, ED, or hospital admission in the last 4 weeks? No   How does patient/caregiver feel medication is working? Good   Financial problems or insurance changes? No   How many doses of your specialty medications were missed in the last 4 weeks? 0   Would patient like to speak to a pharmacist? No   When does the patient need to receive the medication? 07/10/23   Refill Delivery Questions    How will the patient receive the medication? Mail   When does the patient need to receive the medication? 07/10/23   Shipping Address Home   Address in [facility] confirmed and updated if neccessary? Yes   Expected Copay ($) 0   Is the patient able to afford the medication copay? Yes   Payment Method zero copay   Days supply of Refill 21   Supplies needed? No supplies needed   Refill activity completed? Yes   Refill activity plan Refill scheduled   Shipment/Pickup Date: 07/06/23            Current Outpatient Medications   Medication Sig    amitriptyline (ELAVIL) 25 MG tablet Take 25 mg by mouth.    capecitabine (XELODA) 500 MG Tab Take 3 tablets (1,500 mg total) by mouth 2 (two) times daily for days 1-14 of every 21 day cycle.    cimetidine (TAGAMET) 200 MG tablet Take 1 tablet (200 mg total) by mouth 4 (four) times daily. for 10 days    LINZESS 145 mcg Cap  capsule SPRINKLE ENTIRE CONTENTS ON SMALL AMOUNT OF APPLESAUCE TAKE IMMEDIATELY BY MOUTH ON AN EMPTY STOMACH AT LEAST 30 MINUTES PRIOR 1ST MEAL OF THE DAY    metoprolol succinate (TOPROL-XL) 100 MG 24 hr tablet Take 1 tablet (100 mg total) by mouth once daily.    naloxone (NARCAN) 4 mg/actuation Spry CALL 911. SPR CONTENTS OF ONE SPRAYER (0.1ML) INTO ONE NOSTRIL. REPEAT IN 2-3 MIN IF SYMPTOMS OF OPIOID EMERGENCY PERSIST, ALTERNATE NOSTRILS    oxyCODONE (ROXICODONE) 10 mg Tab immediate release tablet Take 1 tablet (10 mg total) by mouth every 8 (eight) hours as needed for Pain.    rizatriptan (MAXALT-MLT) 10 MG disintegrating tablet DISSOLVE ONE TABLET UNDER THE TONGUE AT ONSET OF HEADACHE    traZODone (DESYREL) 50 MG tablet    Last reviewed on 6/20/2023 10:45 AM by Travis Sun MA    Review of patient's allergies indicates:   Allergen Reactions    Aspirin     Ibuprofen     Penicillins     Last reviewed on  6/20/2023 10:45 AM by Travis Sun      Tasks added this encounter   No tasks added.   Tasks due within next 3 months   7/3/2023 - Refill Coordination Outreach (1 time occurrence)     Arianda Baeza, PharmD  [facility]  [address] [place], [place] [address]  [facility] [address]  Phone: [phone]  Fax: [phone]

## 2023-07-20 ENCOUNTER — PATIENT MESSAGE (OUTPATIENT)
Dept: PHARMACY | Facility: CLINIC | Age: 58
End: 2023-07-20

## 2023-07-24 ENCOUNTER — PATIENT MESSAGE (OUTPATIENT)
Dept: RESEARCH | Facility: HOSPITAL | Age: 58
End: 2023-07-24

## 2023-07-24 ENCOUNTER — SPECIALTY PHARMACY (OUTPATIENT)
Dept: PHARMACY | Facility: CLINIC | Age: 58
End: 2023-07-24

## 2023-07-24 NOTE — TELEPHONE ENCOUNTER
Specialty Pharmacy - Refill Coordination    Specialty Medication Orders Linked to Encounter      Flowsheet Row Most Recent Value   Medication #1 capecitabine (XELODA) 500 MG Tab (Order#[id number], Rx#[id number])            Refill Questions - Documented Responses      Flowsheet Row Most Recent Value   Patient Availability and HIPAA Verification    Does patient want to proceed with activity? Yes   HIPAA/medical authority confirmed? Yes   Relationship to patient of person spoken to? Spouse/Significant Other   Refill Screening Questions    Changes to allergies? No   Changes to medications? No   New conditions since last clinic visit? No   Unplanned office visit, urgent care, ED, or hospital admission in the last 4 weeks? No   How does patient/caregiver feel medication is working? Good   Financial problems or insurance changes? No   How many doses of your specialty medications were missed in the last 4 weeks? 0   Would patient like to speak to a pharmacist? No   When does the patient need to receive the medication? 08/01/23   Refill Delivery Questions    How will the patient receive the medication? Mail   When does the patient need to receive the medication? 08/01/23   Shipping Address Home   Address in [facility] confirmed and updated if neccessary? Yes   Expected Copay ($) 0   Is the patient able to afford the medication copay? Yes   Payment Method zero copay   Days supply of Refill 21   Supplies needed? No supplies needed   Refill activity completed? Yes   Refill activity plan Refill scheduled   Shipment/Pickup Date: 07/27/23            Current Outpatient Medications   Medication Sig    amitriptyline (ELAVIL) 25 MG tablet Take 25 mg by mouth.    capecitabine (XELODA) 500 MG Tab Take 3 tablets (1,500 mg total) by mouth 2 (two) times daily for days 1-14 of every 21 day cycle.    cimetidine (TAGAMET) 200 MG tablet Take 1 tablet (200 mg total) by mouth 4 (four) times daily. for 10 days    LINZESS 145 mcg Cap  capsule SPRINKLE ENTIRE CONTENTS ON SMALL AMOUNT OF APPLESAUCE TAKE IMMEDIATELY BY MOUTH ON AN EMPTY STOMACH AT LEAST 30 MINUTES PRIOR 1ST MEAL OF THE DAY    metoprolol succinate (TOPROL-XL) 100 MG 24 hr tablet Take 1 tablet (100 mg total) by mouth once daily.    naloxone (NARCAN) 4 mg/actuation Spry CALL 911. SPR CONTENTS OF ONE SPRAYER (0.1ML) INTO ONE NOSTRIL. REPEAT IN 2-3 MIN IF SYMPTOMS OF OPIOID EMERGENCY PERSIST, ALTERNATE NOSTRILS    oxyCODONE (ROXICODONE) 10 mg Tab immediate release tablet Take 1 tablet (10 mg total) by mouth every 8 (eight) hours as needed for Pain.    rizatriptan (MAXALT-MLT) 10 MG disintegrating tablet DISSOLVE ONE TABLET UNDER THE TONGUE AT ONSET OF HEADACHE    traZODone (DESYREL) 50 MG tablet    Last reviewed on 7/11/2023 11:27 AM by Travis Sun MA    Review of patient's allergies indicates:   Allergen Reactions    Aspirin     Ibuprofen     Penicillins     Last reviewed on  7/11/2023 11:27 AM by Travis Sun      Tasks added this encounter   No tasks added.   Tasks due within next 3 months   7/23/2023 - Refill Coordination Outreach (1 time occurrence)     Ariadna Baeza, PharmD  [facility]  [address] [place], [place] [address]  [facility] [address]  Phone: [phone]  Fax: [phone]

## 2023-07-31 ENCOUNTER — PATIENT MESSAGE (OUTPATIENT)
Dept: RESEARCH | Facility: HOSPITAL | Age: 58
End: 2023-07-31

## 2023-08-10 ENCOUNTER — PATIENT MESSAGE (OUTPATIENT)
Dept: PHARMACY | Facility: CLINIC | Age: 58
End: 2023-08-10

## 2023-08-14 ENCOUNTER — SPECIALTY PHARMACY (OUTPATIENT)
Dept: PHARMACY | Facility: CLINIC | Age: 58
End: 2023-08-14

## 2023-08-14 NOTE — TELEPHONE ENCOUNTER
Outgoing call to pt about refill of Xeloda. Per pts wife, pt has some on hand. Not sure how many. Asked for refill call in one week. Pending accordingly.

## 2023-08-21 ENCOUNTER — SPECIALTY PHARMACY (OUTPATIENT)
Dept: PHARMACY | Facility: CLINIC | Age: 58
End: 2023-08-21

## 2023-08-21 NOTE — TELEPHONE ENCOUNTER
Specialty Pharmacy - Refill Coordination    Specialty Medication Orders Linked to Encounter      Flowsheet Row Most Recent Value   Medication #1 capecitabine (XELODA) 500 MG Tab (Order#[id number], Rx#[id number])            Refill Questions - Documented Responses      Flowsheet Row Most Recent Value   Patient Availability and HIPAA Verification    Does patient want to proceed with activity? Yes   HIPAA/medical authority confirmed? Yes   Relationship to patient of person spoken to? Spouse/Significant Other   Refill Screening Questions    Changes to allergies? No   Changes to medications? No   New conditions since last clinic visit? No   Unplanned office visit, urgent care, ED, or hospital admission in the last 4 weeks? No   How does patient/caregiver feel medication is working? Good   Financial problems or insurance changes? No   How many doses of your specialty medications were missed in the last 4 weeks? 0   Would patient like to speak to a pharmacist? No   When does the patient need to receive the medication? 08/25/23   Refill Delivery Questions    How will the patient receive the medication? Mail   When does the patient need to receive the medication? 08/25/23   Shipping Address Home   Address in [facility] confirmed and updated if neccessary? Yes   Expected Copay ($) 0   Is the patient able to afford the medication copay? Yes   Payment Method zero copay   Days supply of Refill 21   Supplies needed? No supplies needed   Refill activity completed? Yes   Refill activity plan Refill scheduled   Shipment/Pickup Date: 08/23/23            Current Outpatient Medications   Medication Sig    amitriptyline (ELAVIL) 25 MG tablet Take 25 mg by mouth.    capecitabine (XELODA) 500 MG Tab Take 3 tablets (1,500 mg total) by mouth 2 (two) times daily for days 1-14 of every 21 day cycle.    cimetidine (TAGAMET) 200 MG tablet Take 1 tablet (200 mg total) by mouth 4 (four) times daily. for 10 days    cyanocobalamin (VITAMIN  B-12) 1000 MCG tablet Take 100 mcg by mouth once daily.    GARLIC ORAL Take 1,000 mg by mouth once daily.    LINZESS 145 mcg Cap capsule SPRINKLE ENTIRE CONTENTS ON SMALL AMOUNT OF APPLESAUCE TAKE IMMEDIATELY BY MOUTH ON AN EMPTY STOMACH AT LEAST 30 MINUTES PRIOR 1ST MEAL OF THE DAY    metoprolol succinate (TOPROL-XL) 100 MG 24 hr tablet Take 1 tablet (100 mg total) by mouth once daily.    multivitamin (ONE DAILY MULTIVITAMIN) per tablet Take 1 tablet by mouth once daily.    naloxone (NARCAN) 4 mg/actuation Spry CALL 911. SPR CONTENTS OF ONE SPRAYER (0.1ML) INTO ONE NOSTRIL. REPEAT IN 2-3 MIN IF SYMPTOMS OF OPIOID EMERGENCY PERSIST, ALTERNATE NOSTRILS    oxyCODONE (ROXICODONE) 10 mg Tab immediate release tablet Take 1 tablet (10 mg total) by mouth every 8 (eight) hours as needed for Pain.    rizatriptan (MAXALT-MLT) 10 MG disintegrating tablet DISSOLVE ONE TABLET UNDER THE TONGUE AT ONSET OF HEADACHE    traZODone (DESYREL) 50 MG tablet Take 50 mg by mouth every evening.   Last reviewed on 8/21/2023  7:18 AM by Argenis Leslie RN    Review of patient's allergies indicates:   Allergen Reactions    Aspirin     Ibuprofen     Penicillins     Last reviewed on  8/21/2023 8:52 AM by Maritza Reece      Tasks added this encounter   No tasks added.   Tasks due within next 3 months   10/31/2023 - Clinical Assessment (6 month recurrence)  8/21/2023 - Refill Coordination Outreach (1 time occurrence)     Ariadna Baeza, PharmD  [facility]  [address] [place] [address]  Phone: [phone]  Fax: [phone]

## 2024-01-02 PROBLEM — R11.2 CINV (CHEMOTHERAPY-INDUCED NAUSEA AND VOMITING): Status: ACTIVE | Noted: 2024-01-02

## 2024-01-02 PROBLEM — T45.1X5A CINV (CHEMOTHERAPY-INDUCED NAUSEA AND VOMITING): Status: ACTIVE | Noted: 2024-01-02

## 2024-01-02 PROBLEM — G62.9 NEUROPATHY: Status: ACTIVE | Noted: 2024-01-02

## 2024-06-25 PROBLEM — D64.9 SYMPTOMATIC ANEMIA: Status: ACTIVE | Noted: 2024-06-25

## 2024-06-26 PROBLEM — R94.31 ABNORMAL EKG: Status: ACTIVE | Noted: 2024-06-26

## 2024-06-26 PROBLEM — R00.2 PALPITATIONS: Status: ACTIVE | Noted: 2024-06-26

## 2024-08-20 PROBLEM — D53.9 NUTRITIONAL ANEMIA: Status: ACTIVE | Noted: 2024-06-25

## 2024-08-20 PROBLEM — D50.0 ANEMIA DUE TO CHRONIC BLOOD LOSS: Status: ACTIVE | Noted: 2024-08-20

## 2024-10-01 PROBLEM — D70.1 CHEMOTHERAPY-INDUCED NEUTROPENIA: Status: ACTIVE | Noted: 2024-10-01

## 2024-10-01 PROBLEM — T45.1X5A CHEMOTHERAPY-INDUCED NEUTROPENIA: Status: ACTIVE | Noted: 2024-10-01

## 2024-10-17 ENCOUNTER — PATIENT MESSAGE (OUTPATIENT)
Dept: RESEARCH | Facility: HOSPITAL | Age: 59
End: 2024-10-17

## 2024-10-25 ENCOUNTER — PATIENT MESSAGE (OUTPATIENT)
Dept: RESEARCH | Facility: HOSPITAL | Age: 59
End: 2024-10-25

## 2024-11-13 ENCOUNTER — PATIENT MESSAGE (OUTPATIENT)
Dept: RESEARCH | Facility: HOSPITAL | Age: 59
End: 2024-11-13

## 2024-11-19 PROBLEM — D62 ACUTE BLOOD LOSS ANEMIA: Status: ACTIVE | Noted: 2024-11-19

## 2024-11-19 PROBLEM — G89.3 CANCER RELATED PAIN: Status: ACTIVE | Noted: 2024-11-19

## 2024-11-19 PROBLEM — K92.0 HEMATEMESIS: Status: ACTIVE | Noted: 2024-11-19

## 2024-11-20 PROBLEM — R00.0 SINUS TACHYCARDIA: Status: ACTIVE | Noted: 2024-11-20

## 2024-11-21 PROBLEM — I85.01 BLEEDING ESOPHAGEAL VARICES: Status: ACTIVE | Noted: 2024-11-21

## 2024-11-23 PROBLEM — R14.0 ABDOMINAL DISTENSION: Status: ACTIVE | Noted: 2024-11-23

## 2024-11-23 PROBLEM — G93.40 ACUTE ENCEPHALOPATHY: Status: ACTIVE | Noted: 2024-11-23

## 2024-11-23 PROBLEM — J96.01 ACUTE HYPOXIC RESPIRATORY FAILURE: Status: ACTIVE | Noted: 2024-11-23

## 2024-11-23 PROBLEM — E83.51 HYPOCALCEMIA: Status: ACTIVE | Noted: 2024-11-23

## 2024-11-23 PROBLEM — K92.2 ACUTE UPPER GI BLEED: Status: ACTIVE | Noted: 2024-11-23

## 2024-11-23 PROBLEM — Z51.5 COMFORT MEASURES ONLY STATUS: Status: ACTIVE | Noted: 2024-11-23
